# Patient Record
Sex: FEMALE | Race: OTHER | NOT HISPANIC OR LATINO | URBAN - METROPOLITAN AREA
[De-identification: names, ages, dates, MRNs, and addresses within clinical notes are randomized per-mention and may not be internally consistent; named-entity substitution may affect disease eponyms.]

---

## 2022-01-06 ENCOUNTER — IMPORTED ENCOUNTER (OUTPATIENT)
Dept: URBAN - METROPOLITAN AREA CLINIC 1 | Facility: CLINIC | Age: 87
End: 2022-01-06

## 2022-01-06 PROBLEM — Z96.1: Noted: 2022-01-06

## 2022-01-06 PROBLEM — H40.1131: Noted: 2022-01-06

## 2022-01-06 PROBLEM — H18.413: Noted: 2022-01-06

## 2022-01-06 PROCEDURE — 92015 DETERMINE REFRACTIVE STATE: CPT

## 2022-01-06 PROCEDURE — 92250 FUNDUS PHOTOGRAPHY W/I&R: CPT

## 2022-01-06 PROCEDURE — 92004 COMPRE OPH EXAM NEW PT 1/>: CPT

## 2022-01-06 NOTE — PATIENT DISCUSSION
Return for an appointment in 3 months for 10/OCT with PMG (per pt request) with Dr. Michael Barahona.

## 2022-01-06 NOTE — PATIENT DISCUSSION
1.  Mild Open Angle Glaucoma OU -Patient to continue with current gtt regimen Latanoprost OU hs. Patient advised to be compliant with gtts. Condition was discussed with patient and patient understands. Will continue to monitor patient for any progression in condition. Patient was advised to call us with any problems questions or concerns. 2.  Pseudophakia OU 3. [facility]. Return for an appointment in 3 months for 10/OCT with PMG (per pt request).

## 2022-04-03 ASSESSMENT — TONOMETRY
OS_IOP_MMHG: 16
OD_IOP_MMHG: 16

## 2022-04-03 ASSESSMENT — VISUAL ACUITY
OD_CC: 20/20
OS_CC: 20/20
OD_CC: J1+
OS_CC: J1+

## 2022-04-11 ENCOUNTER — FOLLOW UP (OUTPATIENT)
Dept: URBAN - METROPOLITAN AREA CLINIC 1 | Facility: CLINIC | Age: 87
End: 2022-04-11

## 2022-04-11 DIAGNOSIS — H40.023: ICD-10-CM

## 2022-04-11 DIAGNOSIS — H40.053: ICD-10-CM

## 2022-04-11 PROCEDURE — 92133 CPTRZD OPH DX IMG PST SGM ON: CPT

## 2022-04-11 PROCEDURE — 99213 OFFICE O/P EST LOW 20 MIN: CPT

## 2022-04-11 ASSESSMENT — VISUAL ACUITY
OD_SC: 20/20-1
OS_SC: 20/20-2

## 2022-04-11 ASSESSMENT — TONOMETRY
OS_IOP_MMHG: 16
OD_IOP_MMHG: 17

## 2022-04-11 NOTE — PATIENT DISCUSSION
(CD 0.75/0.60) OCT shows minimal thinning OD, WNL OS. IOP stable. Continue Latanoprost QHS OU. Patient is considered high risk. Condition was discussed with patient and patient understands. Will continue to monitor patient for any progression in condition. Patient was advised to call us with any problems, questions, or concerns.

## 2022-04-11 NOTE — PATIENT DISCUSSION
(CD 0.75/0.60Patient to continue with current gtt regimen. Patient advised to be compliant with gtts. Condition was discussed with patient and patient understands. Will continue to monitor patient for any progression in condition. Patient was advised to call us with any problems, questions, or concerns.